# Patient Record
Sex: FEMALE | Race: BLACK OR AFRICAN AMERICAN | NOT HISPANIC OR LATINO | Employment: FULL TIME | URBAN - METROPOLITAN AREA
[De-identification: names, ages, dates, MRNs, and addresses within clinical notes are randomized per-mention and may not be internally consistent; named-entity substitution may affect disease eponyms.]

---

## 2023-09-29 ENCOUNTER — NURSE TRIAGE (OUTPATIENT)
Dept: ADMINISTRATIVE | Facility: CLINIC | Age: 23
End: 2023-09-29

## 2023-09-29 NOTE — TELEPHONE ENCOUNTER
Pt states she has some results in her my chart that she would like to discuss. States she thinks she may need some ABX as well. States has sent messages to her provider through my chart and has not heard back. Has been reaching out for approx 2 weeks and not heard back. Trying to see if there is another way to get the meds she thinks she may need. States she is not having symptoms and declined additional triage. Pt has no additional concerns or questions. Advised NT would route to provider asking for outreach to discuss results and possible need for medication. Advised to call back with concerns or worsening symptoms. Verbalized understanding.   Reason for Disposition   Requesting lab results and adult stable (no new symptoms, not worsening)    Additional Information   Negative: Nursing judgment   Negative: Nursing judgment   Negative: Nursing judgment    Protocols used: Information Only Call - No Triage-A-[place]